# Patient Record
Sex: MALE | NOT HISPANIC OR LATINO | Employment: OTHER | URBAN - NONMETROPOLITAN AREA
[De-identification: names, ages, dates, MRNs, and addresses within clinical notes are randomized per-mention and may not be internally consistent; named-entity substitution may affect disease eponyms.]

---

## 2022-02-02 ENCOUNTER — OUTSIDE FACILITY SERVICE (OUTPATIENT)
Dept: CARDIOLOGY | Facility: CLINIC | Age: 72
End: 2022-02-02

## 2022-02-02 PROCEDURE — 93018 CV STRESS TEST I&R ONLY: CPT | Performed by: INTERNAL MEDICINE

## 2022-03-25 ENCOUNTER — OFFICE VISIT (OUTPATIENT)
Dept: CARDIOLOGY | Facility: CLINIC | Age: 72
End: 2022-03-25

## 2022-03-25 VITALS
DIASTOLIC BLOOD PRESSURE: 84 MMHG | SYSTOLIC BLOOD PRESSURE: 122 MMHG | OXYGEN SATURATION: 98 % | WEIGHT: 206 LBS | HEIGHT: 66 IN | HEART RATE: 74 BPM | BODY MASS INDEX: 33.11 KG/M2

## 2022-03-25 DIAGNOSIS — I10 ESSENTIAL HYPERTENSION: ICD-10-CM

## 2022-03-25 DIAGNOSIS — R07.9 CHEST PAIN, UNSPECIFIED TYPE: Primary | ICD-10-CM

## 2022-03-25 DIAGNOSIS — E78.5 DYSLIPIDEMIA: ICD-10-CM

## 2022-03-25 DIAGNOSIS — E66.09 CLASS 1 OBESITY DUE TO EXCESS CALORIES WITH SERIOUS COMORBIDITY AND BODY MASS INDEX (BMI) OF 33.0 TO 33.9 IN ADULT: ICD-10-CM

## 2022-03-25 DIAGNOSIS — R94.39 ABNORMAL NUCLEAR STRESS TEST: ICD-10-CM

## 2022-03-25 PROBLEM — K21.9 GERD (GASTROESOPHAGEAL REFLUX DISEASE): Status: RESOLVED | Noted: 2022-03-25 | Resolved: 2022-03-25

## 2022-03-25 PROBLEM — K21.9 GERD (GASTROESOPHAGEAL REFLUX DISEASE): Status: ACTIVE | Noted: 2022-03-25

## 2022-03-25 PROCEDURE — 93000 ELECTROCARDIOGRAM COMPLETE: CPT | Performed by: INTERNAL MEDICINE

## 2022-03-25 PROCEDURE — 99204 OFFICE O/P NEW MOD 45 MIN: CPT | Performed by: INTERNAL MEDICINE

## 2022-03-25 RX ORDER — ASPIRIN 81 MG/1
81 TABLET ORAL DAILY
Qty: 30 TABLET | Refills: 11 | Status: SHIPPED | OUTPATIENT
Start: 2022-03-25 | End: 2023-03-07

## 2022-03-25 RX ORDER — METOPROLOL SUCCINATE 25 MG/1
25 TABLET, EXTENDED RELEASE ORAL DAILY
Qty: 30 TABLET | Refills: 11 | Status: SHIPPED | OUTPATIENT
Start: 2022-03-25 | End: 2023-03-07

## 2022-03-25 RX ORDER — OLMESARTAN MEDOXOMIL 40 MG/1
40 TABLET ORAL DAILY
COMMUNITY
Start: 2022-03-04

## 2022-03-25 RX ORDER — NITROGLYCERIN 0.4 MG/1
TABLET SUBLINGUAL
Qty: 25 TABLET | Refills: 11 | Status: SHIPPED | OUTPATIENT
Start: 2022-03-25

## 2022-03-25 RX ORDER — ATORVASTATIN CALCIUM 20 MG/1
20 TABLET, FILM COATED ORAL DAILY
Qty: 30 TABLET | Refills: 11 | Status: SHIPPED | OUTPATIENT
Start: 2022-03-25 | End: 2023-03-07

## 2022-03-25 NOTE — PROGRESS NOTES
Reason for Visit: abnormal nuclear stress.    HPI:  Danyel Pedro is a 72 y.o. male is being seen for consultation today at the request of Eran Gamboa MD for evaluation of chest pain and an abnormal nuclear stress.  He reports the chest pain first started in 2019.  It has intermittently occurred since then and worsened this winter.  He particularly notices the pressure when the weather is cold.  Sometimes the pain and discomfort will radiate to his arms.  He was recently started on olmesartan for high blood pressure and the high blood pressure played a role in the decision to proceed with his recent stress test.  He is active and bikes on a regular basis.  As long as the weather is warm he doesn't seem to have any discomfort.      Previous Cardiac Testing and Procedures:  - Nuclear stress (2/2/2022) reversible septal and apical defect consistent with ischemia, EF 62%    Patient Active Problem List   Diagnosis   • Essential hypertension   • Class 1 obesity due to excess calories with serious comorbidity and body mass index (BMI) of 33.0 to 33.9 in adult   • Dyslipidemia       Social History     Tobacco Use   • Smoking status: Never Smoker   Substance Use Topics   • Alcohol use: Yes     Comment: WINE       History reviewed. No pertinent family history.    The following portions of the patient's history were reviewed and updated as appropriate: allergies, current medications, past family history, past medical history, past social history, past surgical history and problem list.      Current Outpatient Medications:   •  olmesartan (BENICAR) 40 MG tablet, Take 40 mg by mouth Daily., Disp: , Rfl:   •  aspirin (aspirin) 81 MG EC tablet, Take 1 tablet by mouth Daily., Disp: 30 tablet, Rfl: 11  •  atorvastatin (LIPITOR) 20 MG tablet, Take 1 tablet by mouth Daily., Disp: 30 tablet, Rfl: 11  •  metoprolol succinate XL (TOPROL-XL) 25 MG 24 hr tablet, Take 1 tablet by mouth Daily., Disp: 30 tablet, Rfl: 11  •  nitroglycerin  "(NITROSTAT) 0.4 MG SL tablet, 1 under the tongue as needed for angina, may repeat q5mins for up three doses, Disp: 25 tablet, Rfl: 11    Review of Systems   Constitutional: Negative for chills and fever.   Cardiovascular: Positive for chest pain. Negative for paroxysmal nocturnal dyspnea.   Respiratory: Negative for cough and shortness of breath.    Skin: Negative for rash.   Gastrointestinal: Negative for abdominal pain and heartburn.   Neurological: Negative for dizziness and numbness.       Objective   /84 (BP Location: Left arm, Patient Position: Sitting, Cuff Size: Adult)   Pulse 74   Ht 167.6 cm (66\")   Wt 93.4 kg (206 lb)   SpO2 98%   BMI 33.25 kg/m²   Constitutional:       Appearance: Well-developed. Obese.   HENT:      Head: Normocephalic and atraumatic.   Pulmonary:      Effort: Pulmonary effort is normal.      Breath sounds: Normal breath sounds.   Cardiovascular:      Normal rate. Regular rhythm.      Murmurs: There is no murmur.      No gallop. No click.   Edema:     Peripheral edema absent.   Skin:     General: Skin is warm and dry.   Neurological:      Mental Status: Alert and oriented to person, place, and time.         ECG 12 Lead    Date/Time: 3/25/2022 11:50 AM  Performed by: Sachin Fatima MD  Authorized by: Sachin Fatima MD   Previous ECG: no previous ECG available  Rhythm: sinus rhythm  Rate: normal  Conduction: right bundle branch block  Q waves: V6, V5, V4, II, III and aVF                  ICD-10-CM ICD-9-CM   1. Chest pain, unspecified type  R07.9 786.50   2. Abnormal nuclear stress test  R94.39 794.39   3. Essential hypertension  I10 401.9   4. Dyslipidemia  E78.5 272.4   5. Class 1 obesity due to excess calories with serious comorbidity and body mass index (BMI) of 33.0 to 33.9 in adult  E66.09 278.00    Z68.33 V85.33         Assessment/Plan:  1.  Chest pain: Intermittent chest pain episodes during going on for several years.  Discussed treatment options given abnormal " nuclear stress.  We will plan for trial of medical therapy initially as he does not currently take any antianginal medications.  If he continues to have chest pain symptoms then cardiac catheterization can be considered.  Start aspirin, atorvastatin, metoprolol, and sublingual nitroglycerin.    2.  Abnormal nuclear stress nuclear stress from 2/2/2022 showed reversible septal and apical defect consistent with ischemia with EF of 62%.  Started on a trial of optimal medical therapy as discussed above.    3.  Essential hypertension: Blood pressure is acceptable today at 122/84 mmHg.  Continue olmesartan.  Metoprolol started for chest pain and abnormal nuclear stress test which should also help blood pressure.    4.  Dyslipidemia: Start atorvastatin for management of coronary disease as mentioned above and obtain copy of last lipid panel from PCP.    5.  Obesity: Patient's Body mass index is 33.25 kg/m². indicating that he is obese (BMI >30). Obesity-related health conditions include the following: hypertension and coronary heart disease. Obesity is newly identified. BMI is is above average; BMI management plan is completed. We discussed portion control and increasing exercise.

## 2022-03-28 PROBLEM — E66.09 CLASS 1 OBESITY DUE TO EXCESS CALORIES WITH SERIOUS COMORBIDITY AND BODY MASS INDEX (BMI) OF 33.0 TO 33.9 IN ADULT: Status: ACTIVE | Noted: 2022-03-28

## 2022-03-28 PROBLEM — E78.5 DYSLIPIDEMIA: Status: ACTIVE | Noted: 2022-03-28

## 2022-03-28 PROBLEM — E66.811 CLASS 1 OBESITY DUE TO EXCESS CALORIES WITH SERIOUS COMORBIDITY AND BODY MASS INDEX (BMI) OF 33.0 TO 33.9 IN ADULT: Status: ACTIVE | Noted: 2022-03-28

## 2022-04-27 ENCOUNTER — OFFICE VISIT (OUTPATIENT)
Dept: CARDIOLOGY | Facility: CLINIC | Age: 72
End: 2022-04-27

## 2022-04-27 VITALS
HEART RATE: 68 BPM | WEIGHT: 204 LBS | OXYGEN SATURATION: 99 % | HEIGHT: 66 IN | DIASTOLIC BLOOD PRESSURE: 82 MMHG | BODY MASS INDEX: 32.78 KG/M2 | SYSTOLIC BLOOD PRESSURE: 136 MMHG

## 2022-04-27 DIAGNOSIS — E66.09 CLASS 1 OBESITY DUE TO EXCESS CALORIES WITH SERIOUS COMORBIDITY AND BODY MASS INDEX (BMI) OF 32.0 TO 32.9 IN ADULT: ICD-10-CM

## 2022-04-27 DIAGNOSIS — R94.39 ABNORMAL NUCLEAR STRESS TEST: ICD-10-CM

## 2022-04-27 DIAGNOSIS — E78.5 DYSLIPIDEMIA: ICD-10-CM

## 2022-04-27 DIAGNOSIS — I10 ESSENTIAL HYPERTENSION: ICD-10-CM

## 2022-04-27 DIAGNOSIS — I25.10 CORONARY ARTERY DISEASE INVOLVING NATIVE CORONARY ARTERY OF NATIVE HEART WITHOUT ANGINA PECTORIS: Primary | ICD-10-CM

## 2022-04-27 PROCEDURE — 99214 OFFICE O/P EST MOD 30 MIN: CPT | Performed by: INTERNAL MEDICINE

## 2022-10-13 ENCOUNTER — OFFICE VISIT (OUTPATIENT)
Dept: CARDIOLOGY | Facility: CLINIC | Age: 72
End: 2022-10-13

## 2022-10-13 VITALS
BODY MASS INDEX: 31.5 KG/M2 | HEIGHT: 66 IN | HEART RATE: 64 BPM | WEIGHT: 196 LBS | DIASTOLIC BLOOD PRESSURE: 84 MMHG | OXYGEN SATURATION: 98 % | SYSTOLIC BLOOD PRESSURE: 160 MMHG

## 2022-10-13 DIAGNOSIS — I10 ESSENTIAL HYPERTENSION: ICD-10-CM

## 2022-10-13 DIAGNOSIS — E78.5 DYSLIPIDEMIA: ICD-10-CM

## 2022-10-13 DIAGNOSIS — R94.39 ABNORMAL NUCLEAR STRESS TEST: ICD-10-CM

## 2022-10-13 DIAGNOSIS — E66.09 CLASS 1 OBESITY DUE TO EXCESS CALORIES WITH SERIOUS COMORBIDITY AND BODY MASS INDEX (BMI) OF 31.0 TO 31.9 IN ADULT: ICD-10-CM

## 2022-10-13 DIAGNOSIS — I25.10 CORONARY ARTERY DISEASE INVOLVING NATIVE CORONARY ARTERY OF NATIVE HEART WITHOUT ANGINA PECTORIS: Primary | ICD-10-CM

## 2022-10-13 PROCEDURE — 99214 OFFICE O/P EST MOD 30 MIN: CPT | Performed by: INTERNAL MEDICINE

## 2022-10-13 RX ORDER — TRAZODONE HYDROCHLORIDE 150 MG/1
150 TABLET ORAL
COMMUNITY
Start: 2022-09-12

## 2022-10-13 RX ORDER — AMLODIPINE BESYLATE 5 MG/1
5 TABLET ORAL DAILY
Qty: 30 TABLET | Refills: 11 | Status: SHIPPED | OUTPATIENT
Start: 2022-10-13

## 2022-10-13 NOTE — PROGRESS NOTES
Reason for Visit: cardiovascular follow up.    HPI:  Danyel Pedro is a 72 y.o. male is here today for follow-up.  He has been doing well on medical therapy and has no had any chest pain symptoms.  He notes just mild shortness of breath with activity.  His blood pressure is elevated today.  He thinks he may have had too much coffee.  He stays active and has no difficulty with physical activity.  He tries to eat healthy.  His weight is down 8 pounds compared to last visit.    Previous Cardiac Testing and Procedures:  - Nuclear stress (2/2/2022) reversible septal and apical defect consistent with ischemia, EF 62%  - Lipid panel (7/6/2022) total cholesterol 134, HDL 61, LDL 54, triglycerides 92    Patient Active Problem List   Diagnosis   • Essential hypertension   • Class 1 obesity due to excess calories with serious comorbidity and body mass index (BMI) of 31.0 to 31.9 in adult   • Dyslipidemia   • Abnormal nuclear stress test   • Coronary artery disease involving native coronary artery of native heart without angina pectoris       Social History     Tobacco Use   • Smoking status: Never   Vaping Use   • Vaping Use: Never used   Substance Use Topics   • Alcohol use: Yes     Comment: WINE   • Drug use: Defer       History reviewed. No pertinent family history.    The following portions of the patient's history were reviewed and updated as appropriate: allergies, current medications, past family history, past medical history, past social history, past surgical history and problem list.      Current Outpatient Medications:   •  aspirin (aspirin) 81 MG EC tablet, Take 1 tablet by mouth Daily., Disp: 30 tablet, Rfl: 11  •  atorvastatin (LIPITOR) 20 MG tablet, Take 1 tablet by mouth Daily., Disp: 30 tablet, Rfl: 11  •  metoprolol succinate XL (TOPROL-XL) 25 MG 24 hr tablet, Take 1 tablet by mouth Daily., Disp: 30 tablet, Rfl: 11  •  olmesartan (BENICAR) 40 MG tablet, Take 40 mg by mouth Daily., Disp: , Rfl:   •  traZODone  "(DESYREL) 150 MG tablet, Take 1 tablet by mouth every night at bedtime., Disp: , Rfl:   •  amLODIPine (NORVASC) 5 MG tablet, Take 1 tablet by mouth Daily., Disp: 30 tablet, Rfl: 11  •  nitroglycerin (NITROSTAT) 0.4 MG SL tablet, 1 under the tongue as needed for angina, may repeat q5mins for up three doses, Disp: 25 tablet, Rfl: 11    Review of Systems   Constitutional: Negative for chills and fever.   Cardiovascular: Negative for chest pain and paroxysmal nocturnal dyspnea.   Respiratory: Positive for shortness of breath. Negative for cough.    Skin: Negative for rash.   Gastrointestinal: Negative for abdominal pain and heartburn.   Neurological: Negative for dizziness and numbness.       Objective   /84 (BP Location: Left arm, Patient Position: Sitting, Cuff Size: Adult)   Pulse 64   Ht 167.6 cm (65.98\")   Wt 88.9 kg (196 lb)   SpO2 98%   BMI 31.65 kg/m²   Constitutional:       Appearance: Well-developed. Obese.   HENT:      Head: Normocephalic and atraumatic.   Pulmonary:      Effort: Pulmonary effort is normal.      Breath sounds: Normal breath sounds.   Cardiovascular:      Normal rate. Regular rhythm.      Murmurs: There is no murmur.      No gallop. No click.   Skin:     General: Skin is warm and dry.   Neurological:      Mental Status: Alert and oriented to person, place, and time.       Procedures      ICD-10-CM ICD-9-CM   1. Coronary artery disease involving native coronary artery of native heart without angina pectoris  I25.10 414.01   2. Abnormal nuclear stress test  R94.39 794.39   3. Essential hypertension  I10 401.9   4. Dyslipidemia  E78.5 272.4   5. Class 1 obesity due to excess calories with serious comorbidity and body mass index (BMI) of 31.0 to 31.9 in adult  E66.09 278.00    Z68.31 V85.31         Assessment/Plan:  1.  Coronary artery disease: Abnormal nuclear stress test showing reversible septal and apical defect on 2/22/2022.  He remains chest pain free since being on medical " therapy.  Continue aspirin, atorvastatin, metoprolol, and nitroglycerin.     2.  Abnormal nuclear stress nuclear stress:  Reversible septal and apical defect with EF of 62% from 2/22/2022.  He remains chest pain-free on medical therapy.     3.  Essential hypertension:  Blood pressure is elevated today and he notes it is also running a little high at home.  Start amlodipine.      4.  Dyslipidemia:  Lipid panel from 7/6/2022 shows good control.  Continue atorvastatin.     5.  Obesity: BMI is >= 30 and <35. (Class 1 Obesity). The following options were offered after discussion;: exercise counseling/recommendations and nutrition counseling/recommendations.

## 2023-01-19 ENCOUNTER — OFFICE VISIT (OUTPATIENT)
Dept: CARDIOLOGY | Facility: CLINIC | Age: 73
End: 2023-01-19
Payer: MEDICARE

## 2023-01-19 VITALS
SYSTOLIC BLOOD PRESSURE: 126 MMHG | HEIGHT: 66 IN | BODY MASS INDEX: 30.7 KG/M2 | OXYGEN SATURATION: 96 % | DIASTOLIC BLOOD PRESSURE: 84 MMHG | WEIGHT: 191 LBS | HEART RATE: 70 BPM

## 2023-01-19 DIAGNOSIS — I25.10 CORONARY ARTERY DISEASE INVOLVING NATIVE CORONARY ARTERY OF NATIVE HEART WITHOUT ANGINA PECTORIS: Primary | ICD-10-CM

## 2023-01-19 DIAGNOSIS — I10 ESSENTIAL HYPERTENSION: ICD-10-CM

## 2023-01-19 DIAGNOSIS — R94.39 ABNORMAL NUCLEAR STRESS TEST: ICD-10-CM

## 2023-01-19 DIAGNOSIS — E78.5 DYSLIPIDEMIA: ICD-10-CM

## 2023-01-19 PROCEDURE — 99214 OFFICE O/P EST MOD 30 MIN: CPT | Performed by: INTERNAL MEDICINE

## 2023-01-19 NOTE — PROGRESS NOTES
Reason for Visit: cardiovascular follow up.    HPI:  Danyel Pedro is a 72 y.o. male is here today for 3-month follow-up.  He got an upper respiratory infection which he attributes to spending a long time in the airport around the holidays due to flight cancellation.  He is doing well from a cardiac standpoint.  The cold weather doesn't seem to bother him as much as it use to and his breathing is better.  He denies any chest pain, palpitations, dizziness, syncope, PND, or orthopnea.  He does not exercise much this time of year.      Previous Cardiac Testing and Procedures:  - Nuclear stress (2/2/2022) reversible septal and apical defect consistent with ischemia, EF 62%    Lab data:  -Lipid panel (10/6/2022) total cholesterol 134, HDL 61, LDL 54, triglycerides 92    Patient Active Problem List   Diagnosis   • Essential hypertension   • Class 1 obesity due to excess calories with serious comorbidity and body mass index (BMI) of 30.0 to 30.9 in adult   • Dyslipidemia   • Abnormal nuclear stress test   • Coronary artery disease involving native coronary artery of native heart without angina pectoris       Social History     Tobacco Use   • Smoking status: Never   Vaping Use   • Vaping Use: Never used   Substance Use Topics   • Alcohol use: Yes     Comment: WINE   • Drug use: Defer       History reviewed. No pertinent family history.    The following portions of the patient's history were reviewed and updated as appropriate: allergies, current medications, past family history, past medical history, past social history, past surgical history and problem list.      Current Outpatient Medications:   •  amLODIPine (NORVASC) 5 MG tablet, Take 1 tablet by mouth Daily., Disp: 30 tablet, Rfl: 11  •  aspirin (aspirin) 81 MG EC tablet, Take 1 tablet by mouth Daily., Disp: 30 tablet, Rfl: 11  •  atorvastatin (LIPITOR) 20 MG tablet, Take 1 tablet by mouth Daily., Disp: 30 tablet, Rfl: 11  •  metoprolol succinate XL (TOPROL-XL) 25 MG  "24 hr tablet, Take 1 tablet by mouth Daily., Disp: 30 tablet, Rfl: 11  •  nitroglycerin (NITROSTAT) 0.4 MG SL tablet, 1 under the tongue as needed for angina, may repeat q5mins for up three doses, Disp: 25 tablet, Rfl: 11  •  olmesartan (BENICAR) 40 MG tablet, Take 40 mg by mouth Daily., Disp: , Rfl:   •  traZODone (DESYREL) 150 MG tablet, Take 1 tablet by mouth every night at bedtime., Disp: , Rfl:     Review of Systems   Constitutional: Negative for chills and fever.   Cardiovascular: Negative for chest pain and paroxysmal nocturnal dyspnea.   Respiratory: Negative for cough and shortness of breath.    Skin: Negative for rash.   Gastrointestinal: Negative for abdominal pain and heartburn.   Neurological: Negative for dizziness and numbness.       Objective   /84 (BP Location: Left arm, Patient Position: Sitting, Cuff Size: Adult)   Pulse 70   Ht 167.6 cm (65.98\")   Wt 86.6 kg (191 lb)   SpO2 96%   BMI 30.84 kg/m²   Constitutional:       Appearance: Well-developed. Obese.   HENT:      Head: Normocephalic and atraumatic.   Pulmonary:      Effort: Pulmonary effort is normal.      Breath sounds: Normal breath sounds.   Cardiovascular:      Normal rate. Regular rhythm.      Murmurs: There is no murmur.      No gallop. No click.   Skin:     General: Skin is warm and dry.   Neurological:      Mental Status: Alert and oriented to person, place, and time.       Procedures      ICD-10-CM ICD-9-CM   1. Coronary artery disease involving native coronary artery of native heart without angina pectoris  I25.10 414.01   2. Abnormal nuclear stress test  R94.39 794.39   3. Essential hypertension  I10 401.9   4. Dyslipidemia  E78.5 272.4         Assessment/Plan:  1. Coronary artery disease: Abnormal nuclear stress test showing reversible septal and apical defect on 2/22/2022.  No chest pain or other anginal symptoms.  Continue aspirin, atorvastatin, metoprolol, and nitroglycerin.     2.  Abnormal nuclear stress nuclear " stress:  Reversible septal and apical defect on 2/22/2022.   No chest pain symptoms.     3.  Essential hypertension:   Blood pressure is reasonably well controlled on metoprolol and amlodipine.     4.  Dyslipidemia: Good control on lipid panel from 10/6/2022 on atorvastatin.     5.  Obesity: BMI is >= 30 and <35. (Class 1 Obesity). The following options were offered after discussion;: exercise counseling/recommendations and nutrition counseling/recommendations.

## 2023-01-19 NOTE — LETTER
January 19, 2023     Eran Gamboa MD  [address] [place] [id number]    Patient: Danyel Pedro   YOB: 1950   Date of Visit: 1/19/2023       Dear Eran Gamboa MD    Danyel Pedro was in my office today. Below is a copy of my note.    If you have questions, please do not hesitate to call me. I look forward to following Danyel along with you.         Sincerely,        Sachin Fatima MD        CC: No Recipients      Reason for Visit: cardiovascular follow up.    HPI:  Danyel Pedro is a 72 y.o. male is here today for 3-month follow-up.  He got an upper respiratory infection which he attributes to spending a long time in the airport around the holidays due to flight cancellation.  He is doing well from a cardiac standpoint.  The cold weather doesn't seem to bother him as much as it use to and his breathing is better.  He denies any chest pain, palpitations, dizziness, syncope, PND, or orthopnea.  He does not exercise much this time of year.      Previous Cardiac Testing and Procedures:  - Nuclear stress (2/2/2022) reversible septal and apical defect consistent with ischemia, EF 62%    Lab data:  -Lipid panel (10/6/2022) total cholesterol 134, HDL 61, LDL 54, triglycerides 92    Patient Active Problem List   Diagnosis   • Essential hypertension   • Class 1 obesity due to excess calories with serious comorbidity and body mass index (BMI) of 30.0 to 30.9 in adult   • Dyslipidemia   • Abnormal nuclear stress test   • Coronary artery disease involving native coronary artery of native heart without angina pectoris       Social History     Tobacco Use   • Smoking status: Never   Vaping Use   • Vaping Use: Never used   Substance Use Topics   • Alcohol use: Yes     Comment: WINE   • Drug use: Defer       History reviewed. No pertinent family history.    The following portions of the patient's history were reviewed and updated as appropriate: allergies, current medications, past family history, past medical history,  "past social history, past surgical history and problem list.      Current Outpatient Medications:   •  amLODIPine (NORVASC) 5 MG tablet, Take 1 tablet by mouth Daily., Disp: 30 tablet, Rfl: 11  •  aspirin (aspirin) 81 MG EC tablet, Take 1 tablet by mouth Daily., Disp: 30 tablet, Rfl: 11  •  atorvastatin (LIPITOR) 20 MG tablet, Take 1 tablet by mouth Daily., Disp: 30 tablet, Rfl: 11  •  metoprolol succinate XL (TOPROL-XL) 25 MG 24 hr tablet, Take 1 tablet by mouth Daily., Disp: 30 tablet, Rfl: 11  •  nitroglycerin (NITROSTAT) 0.4 MG SL tablet, 1 under the tongue as needed for angina, may repeat q5mins for up three doses, Disp: 25 tablet, Rfl: 11  •  olmesartan (BENICAR) 40 MG tablet, Take 40 mg by mouth Daily., Disp: , Rfl:   •  traZODone (DESYREL) 150 MG tablet, Take 1 tablet by mouth every night at bedtime., Disp: , Rfl:     Review of Systems   Constitutional: Negative for chills and fever.   Cardiovascular: Negative for chest pain and paroxysmal nocturnal dyspnea.   Respiratory: Negative for cough and shortness of breath.    Skin: Negative for rash.   Gastrointestinal: Negative for abdominal pain and heartburn.   Neurological: Negative for dizziness and numbness.       Objective    /84 (BP Location: Left arm, Patient Position: Sitting, Cuff Size: Adult)   Pulse 70   Ht 167.6 cm (65.98\")   Wt 86.6 kg (191 lb)   SpO2 96%   BMI 30.84 kg/m²   Constitutional:       Appearance: Well-developed. Obese.   HENT:      Head: Normocephalic and atraumatic.   Pulmonary:      Effort: Pulmonary effort is normal.      Breath sounds: Normal breath sounds.   Cardiovascular:      Normal rate. Regular rhythm.      Murmurs: There is no murmur.      No gallop. No click.   Skin:     General: Skin is warm and dry.   Neurological:      Mental Status: Alert and oriented to person, place, and time.       Procedures      ICD-10-CM ICD-9-CM   1. Coronary artery disease involving native coronary artery of native heart without angina " pectoris  I25.10 414.01   2. Abnormal nuclear stress test  R94.39 794.39   3. Essential hypertension  I10 401.9   4. Dyslipidemia  E78.5 272.4         Assessment/Plan:  1. Coronary artery disease: Abnormal nuclear stress test showing reversible septal and apical defect on 2/22/2022.  No chest pain or other anginal symptoms.  Continue aspirin, atorvastatin, metoprolol, and nitroglycerin.     2.  Abnormal nuclear stress nuclear stress:  Reversible septal and apical defect on 2/22/2022.   No chest pain symptoms.     3.  Essential hypertension:   Blood pressure is reasonably well controlled on metoprolol and amlodipine.     4.  Dyslipidemia: Good control on lipid panel from 10/6/2022 on atorvastatin.     5.  Obesity: BMI is >= 30 and <35. (Class 1 Obesity). The following options were offered after discussion;: exercise counseling/recommendations and nutrition counseling/recommendations.

## 2023-03-07 RX ORDER — METOPROLOL SUCCINATE 25 MG/1
25 TABLET, EXTENDED RELEASE ORAL DAILY
Qty: 30 TABLET | Refills: 11 | Status: SHIPPED | OUTPATIENT
Start: 2023-03-07

## 2023-03-07 RX ORDER — ASPIRIN 81 MG/1
TABLET, COATED ORAL
Qty: 30 TABLET | Refills: 11 | Status: SHIPPED | OUTPATIENT
Start: 2023-03-07

## 2023-03-07 RX ORDER — ATORVASTATIN CALCIUM 20 MG/1
20 TABLET, FILM COATED ORAL DAILY
Qty: 30 TABLET | Refills: 11 | Status: SHIPPED | OUTPATIENT
Start: 2023-03-07

## 2023-12-11 RX ORDER — METOPROLOL SUCCINATE 25 MG/1
25 TABLET, EXTENDED RELEASE ORAL DAILY
Qty: 30 TABLET | Refills: 11 | Status: SHIPPED | OUTPATIENT
Start: 2023-12-11

## 2024-01-24 ENCOUNTER — OFFICE VISIT (OUTPATIENT)
Dept: CARDIOLOGY | Facility: CLINIC | Age: 74
End: 2024-01-24
Payer: MEDICARE

## 2024-01-24 VITALS
HEIGHT: 65 IN | DIASTOLIC BLOOD PRESSURE: 80 MMHG | HEART RATE: 74 BPM | SYSTOLIC BLOOD PRESSURE: 140 MMHG | WEIGHT: 203 LBS | OXYGEN SATURATION: 98 % | BODY MASS INDEX: 33.82 KG/M2

## 2024-01-24 DIAGNOSIS — I10 ESSENTIAL HYPERTENSION: ICD-10-CM

## 2024-01-24 DIAGNOSIS — E78.5 DYSLIPIDEMIA: ICD-10-CM

## 2024-01-24 DIAGNOSIS — I25.10 CORONARY ARTERY DISEASE INVOLVING NATIVE CORONARY ARTERY OF NATIVE HEART WITHOUT ANGINA PECTORIS: Primary | ICD-10-CM

## 2024-01-24 PROCEDURE — 3079F DIAST BP 80-89 MM HG: CPT | Performed by: INTERNAL MEDICINE

## 2024-01-24 PROCEDURE — 3077F SYST BP >= 140 MM HG: CPT | Performed by: INTERNAL MEDICINE

## 2024-01-24 PROCEDURE — 99214 OFFICE O/P EST MOD 30 MIN: CPT | Performed by: INTERNAL MEDICINE

## 2024-01-24 NOTE — LETTER
January 24, 2024     Eran Gamboa MD  [address] [place] [address]    Patient: Danyel Pedro   YOB: 1950   Date of Visit: 1/24/2024       Dear Eran Gamboa MD    Danyel Pedro was in my office today. Below is a copy of my note.    If you have questions, please do not hesitate to call me. I look forward to following Danyel along with you.         Sincerely,        Sachin Fatima MD        CC: No Recipients      Reason for Visit: cardiovascular follow up.    HPI:  Danyel Pedro is a 73 y.o. male is here today for follow-up.  Follows in cardiology clinic secondary to coronary artery disease.  He had a nuclear stress test done on 2/2/2022 showing reversible septal and apical defects concerning for ischemia.  He has been chest pain-free and therefore elected for medical therapy.     The only time he notices any symptoms is when it is cold out, then he will notice some mild tightness.  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  Blood pressure is elevated today.  He does not check his blood pressure much at home.      Previous Cardiac Testing and Procedures:  -Nuclear stress (2/2/2022) reversible septal and apical defect consistent with ischemia, EF 62%     Lab data:  -Lipid panel (10/6/2022) total cholesterol 134, HDL 61, LDL 54, triglycerides 92  -Lipid panel (1/3/2024) total cholesterol 199, HDL 73, LDL 99, triglycerides 137    Patient Active Problem List   Diagnosis   • Essential hypertension   • Class 1 obesity due to excess calories with serious comorbidity and body mass index (BMI) of 30.0 to 30.9 in adult   • Dyslipidemia   • Abnormal nuclear stress test   • Coronary artery disease involving native coronary artery of native heart without angina pectoris       Social History     Tobacco Use   • Smoking status: Never   Vaping Use   • Vaping Use: Never used   Substance Use Topics   • Alcohol use: Yes     Comment: WINE   • Drug use: Defer       History reviewed. No  "pertinent family history.    The following portions of the patient's history were reviewed and updated as appropriate: allergies, current medications, past family history, past medical history, past social history, past surgical history, and problem list.      Current Outpatient Medications:   •  amLODIPine (NORVASC) 5 MG tablet, Take 1 tablet by mouth Daily., Disp: 30 tablet, Rfl: 11  •  Aspirin Low Dose 81 MG EC tablet, TAKE 1 TABLET BY MOUTH ONCE DAILY, Disp: 30 tablet, Rfl: 11  •  atorvastatin (LIPITOR) 20 MG tablet, TAKE 1 TABLET BY MOUTH DAILY, Disp: 30 tablet, Rfl: 11  •  metoprolol succinate XL (TOPROL-XL) 25 MG 24 hr tablet, TAKE 1 TABLET BY MOUTH DAILY, Disp: 30 tablet, Rfl: 11  •  nitroglycerin (NITROSTAT) 0.4 MG SL tablet, 1 under the tongue as needed for angina, may repeat q5mins for up three doses, Disp: 25 tablet, Rfl: 11  •  olmesartan (BENICAR) 40 MG tablet, Take 1 tablet by mouth Daily., Disp: , Rfl:   •  traZODone (DESYREL) 150 MG tablet, Take 1 tablet by mouth every night at bedtime., Disp: , Rfl:     Review of Systems   Constitutional: Negative for chills and fever.   Cardiovascular:  Positive for chest pain (chest tightness during the cold). Negative for dyspnea on exertion and paroxysmal nocturnal dyspnea.   Respiratory:  Negative for cough and shortness of breath.    Skin:  Negative for rash.   Gastrointestinal:  Negative for abdominal pain and heartburn.   Neurological:  Negative for dizziness and numbness.       Objective  /80 (BP Location: Left arm, Patient Position: Sitting, Cuff Size: Adult)   Pulse 74   Ht 165.1 cm (65\")   Wt 92.1 kg (203 lb)   SpO2 98%   BMI 33.78 kg/m²   Constitutional:       Appearance: Well-developed.   HENT:      Head: Normocephalic and atraumatic.   Pulmonary:      Effort: Pulmonary effort is normal.      Breath sounds: Normal breath sounds.   Cardiovascular:      Normal rate. Regular rhythm.      Murmurs: There is no murmur.      No gallop.  No click. "   Edema:     Peripheral edema absent.   Skin:     General: Skin is warm and dry.   Neurological:      Mental Status: Alert and oriented to person, place, and time.       Procedures      ICD-10-CM ICD-9-CM   1. Coronary artery disease involving native coronary artery of native heart without angina pectoris  I25.10 414.01   2. Essential hypertension  I10 401.9   3. Dyslipidemia  E78.5 272.4         Assessment/Plan:  1.  Coronary artery disease: Abnormal nuclear stress on 2/22/2022 with reversible septal and apical defect.  Elected for medical therapy given lack of any significant symptoms.  Only mild chest tightness during cold weather but no significant angina.  Continue aspirin, atorvastatin, metoprolol, and nitroglycerin.      2.  Essential hypertension: Blood pressures is elevated today.  Encourage him to monitor at home and consider dose adjustment if necessary.  Continue amlodipine, olmesartan, and metoprolol.     3.  Dyslipidemia: Acceptable control on lipid panel from 1/3/2024.  Continue atorvastatin.

## 2024-01-24 NOTE — PROGRESS NOTES
Reason for Visit: cardiovascular follow up.    HPI:  Danyel Pedro is a 73 y.o. male is here today for follow-up.  Follows in cardiology clinic secondary to coronary artery disease.  He had a nuclear stress test done on 2/2/2022 showing reversible septal and apical defects concerning for ischemia.  He has been chest pain-free and therefore elected for medical therapy.     The only time he notices any symptoms is when it is cold out, then he will notice some mild tightness.  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  Blood pressure is elevated today.  He does not check his blood pressure much at home.      Previous Cardiac Testing and Procedures:  -Nuclear stress (2/2/2022) reversible septal and apical defect consistent with ischemia, EF 62%     Lab data:  -Lipid panel (10/6/2022) total cholesterol 134, HDL 61, LDL 54, triglycerides 92  -Lipid panel (1/3/2024) total cholesterol 199, HDL 73, LDL 99, triglycerides 137    Patient Active Problem List   Diagnosis    Essential hypertension    Class 1 obesity due to excess calories with serious comorbidity and body mass index (BMI) of 30.0 to 30.9 in adult    Dyslipidemia    Abnormal nuclear stress test    Coronary artery disease involving native coronary artery of native heart without angina pectoris       Social History     Tobacco Use    Smoking status: Never   Vaping Use    Vaping Use: Never used   Substance Use Topics    Alcohol use: Yes     Comment: WINE    Drug use: Defer       History reviewed. No pertinent family history.    The following portions of the patient's history were reviewed and updated as appropriate: allergies, current medications, past family history, past medical history, past social history, past surgical history, and problem list.      Current Outpatient Medications:     amLODIPine (NORVASC) 5 MG tablet, Take 1 tablet by mouth Daily., Disp: 30 tablet, Rfl: 11    Aspirin Low Dose 81 MG EC tablet, TAKE 1 TABLET BY  "MOUTH ONCE DAILY, Disp: 30 tablet, Rfl: 11    atorvastatin (LIPITOR) 20 MG tablet, TAKE 1 TABLET BY MOUTH DAILY, Disp: 30 tablet, Rfl: 11    metoprolol succinate XL (TOPROL-XL) 25 MG 24 hr tablet, TAKE 1 TABLET BY MOUTH DAILY, Disp: 30 tablet, Rfl: 11    nitroglycerin (NITROSTAT) 0.4 MG SL tablet, 1 under the tongue as needed for angina, may repeat q5mins for up three doses, Disp: 25 tablet, Rfl: 11    olmesartan (BENICAR) 40 MG tablet, Take 1 tablet by mouth Daily., Disp: , Rfl:     traZODone (DESYREL) 150 MG tablet, Take 1 tablet by mouth every night at bedtime., Disp: , Rfl:     Review of Systems   Constitutional: Negative for chills and fever.   Cardiovascular:  Positive for chest pain (chest tightness during the cold). Negative for dyspnea on exertion and paroxysmal nocturnal dyspnea.   Respiratory:  Negative for cough and shortness of breath.    Skin:  Negative for rash.   Gastrointestinal:  Negative for abdominal pain and heartburn.   Neurological:  Negative for dizziness and numbness.       Objective   /80 (BP Location: Left arm, Patient Position: Sitting, Cuff Size: Adult)   Pulse 74   Ht 165.1 cm (65\")   Wt 92.1 kg (203 lb)   SpO2 98%   BMI 33.78 kg/m²   Constitutional:       Appearance: Well-developed.   HENT:      Head: Normocephalic and atraumatic.   Pulmonary:      Effort: Pulmonary effort is normal.      Breath sounds: Normal breath sounds.   Cardiovascular:      Normal rate. Regular rhythm.      Murmurs: There is no murmur.      No gallop.  No click.   Edema:     Peripheral edema absent.   Skin:     General: Skin is warm and dry.   Neurological:      Mental Status: Alert and oriented to person, place, and time.       Procedures      ICD-10-CM ICD-9-CM   1. Coronary artery disease involving native coronary artery of native heart without angina pectoris  I25.10 414.01   2. Essential hypertension  I10 401.9   3. Dyslipidemia  E78.5 272.4         Assessment/Plan:  1.  Coronary artery disease: " Abnormal nuclear stress on 2/22/2022 with reversible septal and apical defect.  Elected for medical therapy given lack of any significant symptoms.  Only mild chest tightness during cold weather but no significant angina.  Continue aspirin, atorvastatin, metoprolol, and nitroglycerin.      2.  Essential hypertension: Blood pressures is elevated today.  Encourage him to monitor at home and consider dose adjustment if necessary.  Continue amlodipine, olmesartan, and metoprolol.     3.  Dyslipidemia: Acceptable control on lipid panel from 1/3/2024.  Continue atorvastatin.

## 2024-03-12 RX ORDER — AMLODIPINE BESYLATE 5 MG/1
5 TABLET ORAL DAILY
Qty: 30 TABLET | Refills: 11 | Status: SHIPPED | OUTPATIENT
Start: 2024-03-12

## 2024-03-12 RX ORDER — ATORVASTATIN CALCIUM 20 MG/1
20 TABLET, FILM COATED ORAL DAILY
Qty: 30 TABLET | Refills: 11 | Status: SHIPPED | OUTPATIENT
Start: 2024-03-12

## 2024-06-06 ENCOUNTER — OFFICE VISIT (OUTPATIENT)
Dept: CARDIOLOGY | Facility: CLINIC | Age: 74
End: 2024-06-06
Payer: MEDICARE

## 2024-06-06 VITALS
HEIGHT: 65 IN | OXYGEN SATURATION: 98 % | BODY MASS INDEX: 33.15 KG/M2 | DIASTOLIC BLOOD PRESSURE: 84 MMHG | SYSTOLIC BLOOD PRESSURE: 136 MMHG | HEART RATE: 70 BPM | WEIGHT: 199 LBS

## 2024-06-06 DIAGNOSIS — I10 ESSENTIAL HYPERTENSION: ICD-10-CM

## 2024-06-06 DIAGNOSIS — I25.118 CORONARY ARTERY DISEASE OF NATIVE ARTERY OF NATIVE HEART WITH STABLE ANGINA PECTORIS: Primary | ICD-10-CM

## 2024-06-06 DIAGNOSIS — E78.5 DYSLIPIDEMIA: ICD-10-CM

## 2024-06-06 PROCEDURE — 3075F SYST BP GE 130 - 139MM HG: CPT | Performed by: INTERNAL MEDICINE

## 2024-06-06 PROCEDURE — 3079F DIAST BP 80-89 MM HG: CPT | Performed by: INTERNAL MEDICINE

## 2024-06-06 PROCEDURE — 99214 OFFICE O/P EST MOD 30 MIN: CPT | Performed by: INTERNAL MEDICINE

## 2024-06-06 RX ORDER — AMLODIPINE BESYLATE 10 MG/1
10 TABLET ORAL DAILY
Qty: 90 TABLET | Refills: 3 | Status: SHIPPED | OUTPATIENT
Start: 2024-06-06

## 2024-06-06 NOTE — PROGRESS NOTES
Reason for Visit: cardiovascular follow up.    HPI:  Danyel Pedro is a 74 y.o. male is here today for follow-up.  He reports doing relatively well for the most part.  He has noted his blood pressure running high at home and is a little bit elevated today as well.  He has been trying to stay active and walk more whenever possible.  He Akua at first denies any chest pain but does report a little bit of tightness in his chest with activity when pressed.  He denies any palpitations, dizziness, syncope, PND, or orthopnea.      Previous Cardiac Testing and Procedures:  -Nuclear stress (2/2/2022) reversible septal and apical defect consistent with ischemia, EF 62%     Lab data:  -Lipid panel (10/6/2022) total cholesterol 134, HDL 61, LDL 54, triglycerides 92  -Lipid panel (1/3/2024) total cholesterol 199, HDL 73, LDL 99, triglycerides 137    Patient Active Problem List   Diagnosis    Essential hypertension    Class 1 obesity due to excess calories with serious comorbidity and body mass index (BMI) of 30.0 to 30.9 in adult    Dyslipidemia    Abnormal nuclear stress test    Coronary artery disease of native artery of native heart with stable angina pectoris       Social History     Tobacco Use    Smoking status: Never   Vaping Use    Vaping status: Never Used   Substance Use Topics    Alcohol use: Yes     Comment: WINE    Drug use: Defer       History reviewed. No pertinent family history.    The following portions of the patient's history were reviewed and updated as appropriate: allergies, current medications, past family history, past medical history, past social history, past surgical history, and problem list.      Current Outpatient Medications:     amLODIPine (NORVASC) 10 MG tablet, Take 1 tablet by mouth Daily., Disp: 90 tablet, Rfl: 3    Aspirin Low Dose 81 MG EC tablet, TAKE 1 TABLET BY MOUTH ONCE DAILY, Disp: 30 tablet, Rfl: 11    atorvastatin (LIPITOR) 20 MG tablet, Take 1 tablet by mouth Daily., Disp: 30 tablet,  "Rfl: 11    metoprolol succinate XL (TOPROL-XL) 25 MG 24 hr tablet, TAKE 1 TABLET BY MOUTH DAILY, Disp: 30 tablet, Rfl: 11    olmesartan (BENICAR) 40 MG tablet, Take 1 tablet by mouth Daily., Disp: , Rfl:     traZODone (DESYREL) 150 MG tablet, Take 1 tablet by mouth every night at bedtime., Disp: , Rfl:     nitroglycerin (NITROSTAT) 0.4 MG SL tablet, 1 under the tongue as needed for angina, may repeat q5mins for up three doses (Patient not taking: Reported on 6/6/2024), Disp: 25 tablet, Rfl: 11    Review of Systems   Constitutional: Negative for chills and fever.   Cardiovascular:  Positive for chest pain (chest tightness). Negative for paroxysmal nocturnal dyspnea.   Respiratory:  Negative for cough and shortness of breath.    Skin:  Negative for rash.   Gastrointestinal:  Negative for abdominal pain and heartburn.   Neurological:  Negative for dizziness and numbness.       Objective   /84 (BP Location: Left arm, Patient Position: Sitting, Cuff Size: Adult)   Pulse 70   Ht 165.1 cm (65\")   Wt 90.3 kg (199 lb)   SpO2 98%   BMI 33.12 kg/m²   Constitutional:       Appearance: Well-developed.   HENT:      Head: Normocephalic and atraumatic.   Pulmonary:      Effort: Pulmonary effort is normal.      Breath sounds: Normal breath sounds.   Cardiovascular:      Normal rate. Regular rhythm.      Murmurs: There is no murmur.   Edema:     Peripheral edema absent.   Skin:     General: Skin is warm and dry.   Neurological:      Mental Status: Alert and oriented to person, place, and time.       Procedures      ICD-10-CM ICD-9-CM   1. Coronary artery disease of native artery of native heart with stable angina pectoris  I25.118 414.01     413.9   2. Essential hypertension  I10 401.9   3. Dyslipidemia  E78.5 272.4         Assessment/Plan:  1.  Coronary artery disease: Abnormal nuclear stress on 2/22/2022 with reversible septal and apical defect.  He denies any actual chest pain but does report some mild chest tightness " with activity.  We again discussed cardiac catheterization and he prefers to hold off for now but will consider this in the future.  Continue aspirin, atorvastatin, metoprolol, and nitroglycerin.      2.  Essential hypertension: Blood pressure remains elevated today.  Titrate up amlodipine to 10 mg.  Continue olmesartan and metoprolol.      3.  Dyslipidemia: Lipid panel on 1/3/2024 showed reasonably good control on atorvastatin.

## 2024-06-06 NOTE — LETTER
June 6, 2024     Eran Gamboa MD  [address] [facility] [address]    Patient: Danyel Pedro   YOB: 1950   Date of Visit: 6/6/2024       Dear Eran Gamboa MD    aDnyel Pedro was in my office today. Below is a copy of my note.    If you have questions, please do not hesitate to call me. I look forward to following Danyel along with you.         Sincerely,        Sachin Fatima MD        CC: No Recipients      Reason for Visit: cardiovascular follow up.    HPI:  Danyel Pedro is a 74 y.o. male is here today for follow-up.  He reports doing relatively well for the most part.  He has noted his blood pressure running high at home and is a little bit elevated today as well.  He has been trying to stay active and walk more whenever possible.  He Ak[place] at first denies any chest pain but does report a little bit of tightness in his chest with activity when pressed.  He denies any palpitations, dizziness, syncope, PND, or orthopnea.      Previous Cardiac Testing and Procedures:  -Nuclear stress (2/2/2022) reversible septal and apical defect consistent with ischemia, EF 62%     Lab data:  -Lipid panel (10/6/2022) total cholesterol 134, HDL 61, LDL 54, triglycerides 92  -Lipid panel (1/3/2024) total cholesterol 199, HDL 73, LDL 99, triglycerides 137    Patient Active Problem List   Diagnosis   • Essential hypertension   • Class 1 obesity due to excess calories with serious comorbidity and body mass index (BMI) of 30.0 to 30.9 in adult   • Dyslipidemia   • Abnormal nuclear stress test   • Coronary artery disease of native artery of native heart with stable angina pectoris       Social History     Tobacco Use   • Smoking status: Never   Vaping Use   • Vaping status: Never Used   Substance Use Topics   • Alcohol use: Yes     Comment: WINE   • Drug use: Defer       History reviewed. No pertinent family history.    The following portions of the patient's history were reviewed and updated as appropriate: allergies, current  "medications, past family history, past medical history, past social history, past surgical history, and problem list.      Current Outpatient Medications:   •  amLODIPine (NORVASC) 10 MG tablet, Take 1 tablet by mouth Daily., Disp: 90 tablet, Rfl: 3  •  Aspirin Low Dose 81 MG EC tablet, TAKE 1 TABLET BY MOUTH ONCE DAILY, Disp: 30 tablet, Rfl: 11  •  atorvastatin (LIPITOR) 20 MG tablet, Take 1 tablet by mouth Daily., Disp: 30 tablet, Rfl: 11  •  metoprolol succinate XL (TOPROL-XL) 25 MG 24 hr tablet, TAKE 1 TABLET BY MOUTH DAILY, Disp: 30 tablet, Rfl: 11  •  olmesartan (BENICAR) 40 MG tablet, Take 1 tablet by mouth Daily., Disp: , Rfl:   •  traZODone (DESYREL) 150 MG tablet, Take 1 tablet by mouth every night at bedtime., Disp: , Rfl:   •  nitroglycerin (NITROSTAT) 0.4 MG SL tablet, 1 under the tongue as needed for angina, may repeat q5mins for up three doses (Patient not taking: Reported on 6/6/2024), Disp: 25 tablet, Rfl: 11    Review of Systems   Constitutional: Negative for chills and fever.   Cardiovascular:  Positive for chest pain (chest tightness). Negative for paroxysmal nocturnal dyspnea.   Respiratory:  Negative for cough and shortness of breath.    Skin:  Negative for rash.   Gastrointestinal:  Negative for abdominal pain and heartburn.   Neurological:  Negative for dizziness and numbness.       Objective  /84 (BP Location: Left arm, Patient Position: Sitting, Cuff Size: Adult)   Pulse 70   Ht 165.1 cm (65\")   Wt 90.3 kg (199 lb)   SpO2 98%   BMI 33.12 kg/m²   Constitutional:       Appearance: Well-developed.   HENT:      Head: Normocephalic and atraumatic.   Pulmonary:      Effort: Pulmonary effort is normal.      Breath sounds: Normal breath sounds.   Cardiovascular:      Normal rate. Regular rhythm.      Murmurs: There is no murmur.   Edema:     Peripheral edema absent.   Skin:     General: Skin is warm and dry.   Neurological:      Mental Status: Alert and oriented to person, place, and " time.       Procedures      ICD-10-CM ICD-9-CM   1. Coronary artery disease of native artery of native heart with stable angina pectoris  I25.118 414.01     413.9   2. Essential hypertension  I10 401.9   3. Dyslipidemia  E78.5 272.4         Assessment/Plan:  1.  Coronary artery disease: Abnormal nuclear stress on 2/22/2022 with reversible septal and apical defect.  He denies any actual chest pain but does report some mild chest tightness with activity.  We again discussed cardiac catheterization and he prefers to hold off for now but will consider this in the future.  Continue aspirin, atorvastatin, metoprolol, and nitroglycerin.      2.  Essential hypertension: Blood pressure remains elevated today.  Titrate up amlodipine to 10 mg.  Continue olmesartan and metoprolol.      3.  Dyslipidemia: Lipid panel on 1/3/2024 showed reasonably good control on atorvastatin.

## 2024-10-02 NOTE — PROGRESS NOTES
Reason for Visit: cardiovascular follow up.    HPI:  Danyel Pedro is a 74 y.o. male is here today for follow-up.  At last visit in June he reported mild chest tightness.  Cardiac catheterization was discussed and he elected to hold off.      Today he denies any chest pain or dyspnea on exertion.  He has not had to take any nitroglycerin.  Blood pressure has been well controlled recently.  He started walking more whenever possible.  He does not feel like he eats much, but has not been successful at losing weight.      Previous Cardiac Testing and Procedures:  -Nuclear stress (2/2/2022) reversible septal and apical defect consistent with ischemia, EF 62%     Lab data:  -Lipid panel (10/6/2022) total cholesterol 134, HDL 61, LDL 54, triglycerides 92  -Lipid panel (1/3/2024) total cholesterol 199, HDL 73, LDL 99, triglycerides 137    Patient Active Problem List   Diagnosis    Essential hypertension    Class 1 obesity due to excess calories with serious comorbidity and body mass index (BMI) of 30.0 to 30.9 in adult    Dyslipidemia    Abnormal nuclear stress test    Coronary artery disease involving native coronary artery of native heart without angina pectoris       Social History     Tobacco Use    Smoking status: Never   Vaping Use    Vaping status: Never Used   Substance Use Topics    Alcohol use: Yes     Comment: WINE    Drug use: Defer       History reviewed. No pertinent family history.    The following portions of the patient's history were reviewed and updated as appropriate: allergies, current medications, past family history, past medical history, past social history, past surgical history, and problem list.      Current Outpatient Medications:     amLODIPine (NORVASC) 10 MG tablet, Take 1 tablet by mouth Daily., Disp: 90 tablet, Rfl: 3    Aspirin Low Dose 81 MG EC tablet, TAKE 1 TABLET BY MOUTH ONCE DAILY, Disp: 30 tablet, Rfl: 11    atorvastatin (LIPITOR) 20 MG tablet, Take 1 tablet by mouth Daily., Disp: 30  "tablet, Rfl: 11    metoprolol succinate XL (TOPROL-XL) 25 MG 24 hr tablet, TAKE 1 TABLET BY MOUTH DAILY, Disp: 30 tablet, Rfl: 11    nitroglycerin (NITROSTAT) 0.4 MG SL tablet, 1 under the tongue as needed for angina, may repeat q5mins for up three doses, Disp: 25 tablet, Rfl: 11    olmesartan (BENICAR) 40 MG tablet, Take 1 tablet by mouth Daily., Disp: , Rfl:     traZODone (DESYREL) 150 MG tablet, Take 1 tablet by mouth every night at bedtime., Disp: , Rfl:     Review of Systems   Constitutional: Negative for chills and fever.   Cardiovascular:  Negative for chest pain, dyspnea on exertion, palpitations, paroxysmal nocturnal dyspnea and syncope.   Respiratory:  Negative for cough and shortness of breath.    Skin:  Negative for rash.   Gastrointestinal:  Negative for abdominal pain and heartburn.   Neurological:  Negative for dizziness and numbness.       Objective   /84 (BP Location: Left arm, Patient Position: Sitting, Cuff Size: Adult)   Pulse 75   Ht 165.1 cm (65\")   Wt 91.6 kg (202 lb)   SpO2 98%   BMI 33.61 kg/m²   Constitutional:       Appearance: Well-developed. Obese.   HENT:      Head: Normocephalic and atraumatic.   Pulmonary:      Effort: Pulmonary effort is normal.      Breath sounds: Normal breath sounds.   Cardiovascular:      Normal rate. Regular rhythm.      Murmurs: There is a grade 1/6 holosystolic murmur.   Edema:     Peripheral edema absent.   Skin:     General: Skin is warm and dry.   Neurological:      Mental Status: Alert and oriented to person, place, and time.       Procedures      ICD-10-CM ICD-9-CM   1. Coronary artery disease involving native coronary artery of native heart without angina pectoris  I25.10 414.01   2. Essential hypertension  I10 401.9   3. Dyslipidemia  E78.5 272.4         Assessment/Plan:  1.  Coronary artery disease: Abnormal nuclear stress on 2/22/2022 with reversible septal and apical defect.  No current chest pain symptoms.  He elects to continue medical " therapy.  Continue aspirin, atorvastatin, metoprolol, and nitroglycerin.      2.  Essential hypertension: Improved control today.  Continue olmesartan, amlodipine, and metoprolol.      3.  Dyslipidemia: Good control on atorvastatin based on lipid panel on 1/3/2024.

## 2024-10-03 ENCOUNTER — OFFICE VISIT (OUTPATIENT)
Dept: CARDIOLOGY | Facility: CLINIC | Age: 74
End: 2024-10-03
Payer: MEDICARE

## 2024-10-03 VITALS
DIASTOLIC BLOOD PRESSURE: 84 MMHG | SYSTOLIC BLOOD PRESSURE: 130 MMHG | BODY MASS INDEX: 33.66 KG/M2 | OXYGEN SATURATION: 98 % | WEIGHT: 202 LBS | HEIGHT: 65 IN | HEART RATE: 75 BPM

## 2024-10-03 DIAGNOSIS — I10 ESSENTIAL HYPERTENSION: ICD-10-CM

## 2024-10-03 DIAGNOSIS — E78.5 DYSLIPIDEMIA: ICD-10-CM

## 2024-10-03 DIAGNOSIS — I25.10 CORONARY ARTERY DISEASE INVOLVING NATIVE CORONARY ARTERY OF NATIVE HEART WITHOUT ANGINA PECTORIS: Primary | ICD-10-CM

## 2024-10-03 PROBLEM — I25.118 CORONARY ARTERY DISEASE OF NATIVE ARTERY OF NATIVE HEART WITH STABLE ANGINA PECTORIS: Status: RESOLVED | Noted: 2022-04-27 | Resolved: 2024-10-03

## 2024-10-03 PROCEDURE — 3079F DIAST BP 80-89 MM HG: CPT | Performed by: INTERNAL MEDICINE

## 2024-10-03 PROCEDURE — 3075F SYST BP GE 130 - 139MM HG: CPT | Performed by: INTERNAL MEDICINE

## 2024-10-03 PROCEDURE — 99214 OFFICE O/P EST MOD 30 MIN: CPT | Performed by: INTERNAL MEDICINE

## 2024-10-03 PROCEDURE — 1159F MED LIST DOCD IN RCRD: CPT | Performed by: INTERNAL MEDICINE

## 2024-10-03 PROCEDURE — 1160F RVW MEDS BY RX/DR IN RCRD: CPT | Performed by: INTERNAL MEDICINE

## 2024-10-03 NOTE — LETTER
October 3, 2024     Eran Gamboa MD  [address] [place] [id number]    Patient: Danyel Pedro   YOB: 1950   Date of Visit: 10/3/2024       Dear Eran Gamboa MD    Danyel Pedro was in my office today. Below is a copy of my note.    If you have questions, please do not hesitate to call me. I look forward to following Danyel along with you.         Sincerely,        Sachin Fatima MD        CC: No Recipients      Reason for Visit: cardiovascular follow up.    HPI:  Danyel Pedro is a 74 y.o. male is here today for follow-up.  At last visit in June he reported mild chest tightness.  Cardiac catheterization was discussed and he elected to hold off.      Today he denies any chest pain or dyspnea on exertion.  He has not had to take any nitroglycerin.  Blood pressure has been well controlled recently.  He started walking more whenever possible.  He does not feel like he eats much, but has not been successful at losing weight.      Previous Cardiac Testing and Procedures:  -Nuclear stress (2/2/2022) reversible septal and apical defect consistent with ischemia, EF 62%     Lab data:  -Lipid panel (10/6/2022) total cholesterol 134, HDL 61, LDL 54, triglycerides 92  -Lipid panel (1/3/2024) total cholesterol 199, HDL 73, LDL 99, triglycerides 137    Patient Active Problem List   Diagnosis   • Essential hypertension   • Class 1 obesity due to excess calories with serious comorbidity and body mass index (BMI) of 30.0 to 30.9 in adult   • Dyslipidemia   • Abnormal nuclear stress test   • Coronary artery disease involving native coronary artery of native heart without angina pectoris       Social History     Tobacco Use   • Smoking status: Never   Vaping Use   • Vaping status: Never Used   Substance Use Topics   • Alcohol use: Yes     Comment: WINE   • Drug use: Defer       History reviewed. No pertinent family history.    The following portions of the patient's history were reviewed and updated as appropriate:  "allergies, current medications, past family history, past medical history, past social history, past surgical history, and problem list.      Current Outpatient Medications:   •  amLODIPine (NORVASC) 10 MG tablet, Take 1 tablet by mouth Daily., Disp: 90 tablet, Rfl: 3  •  Aspirin Low Dose 81 MG EC tablet, TAKE 1 TABLET BY MOUTH ONCE DAILY, Disp: 30 tablet, Rfl: 11  •  atorvastatin (LIPITOR) 20 MG tablet, Take 1 tablet by mouth Daily., Disp: 30 tablet, Rfl: 11  •  metoprolol succinate XL (TOPROL-XL) 25 MG 24 hr tablet, TAKE 1 TABLET BY MOUTH DAILY, Disp: 30 tablet, Rfl: 11  •  nitroglycerin (NITROSTAT) 0.4 MG SL tablet, 1 under the tongue as needed for angina, may repeat q5mins for up three doses, Disp: 25 tablet, Rfl: 11  •  olmesartan (BENICAR) 40 MG tablet, Take 1 tablet by mouth Daily., Disp: , Rfl:   •  traZODone (DESYREL) 150 MG tablet, Take 1 tablet by mouth every night at bedtime., Disp: , Rfl:     Review of Systems   Constitutional: Negative for chills and fever.   Cardiovascular:  Negative for chest pain, dyspnea on exertion, palpitations, paroxysmal nocturnal dyspnea and syncope.   Respiratory:  Negative for cough and shortness of breath.    Skin:  Negative for rash.   Gastrointestinal:  Negative for abdominal pain and heartburn.   Neurological:  Negative for dizziness and numbness.       Objective  /84 (BP Location: Left arm, Patient Position: Sitting, Cuff Size: Adult)   Pulse 75   Ht 165.1 cm (65\")   Wt 91.6 kg (202 lb)   SpO2 98%   BMI 33.61 kg/m²   Constitutional:       Appearance: Well-developed. Obese.   HENT:      Head: Normocephalic and atraumatic.   Pulmonary:      Effort: Pulmonary effort is normal.      Breath sounds: Normal breath sounds.   Cardiovascular:      Normal rate. Regular rhythm.      Murmurs: There is a grade 1/6 holosystolic murmur.   Edema:     Peripheral edema absent.   Skin:     General: Skin is warm and dry.   Neurological:      Mental Status: Alert and oriented to " person, place, and time.       Procedures      ICD-10-CM ICD-9-CM   1. Coronary artery disease involving native coronary artery of native heart without angina pectoris  I25.10 414.01   2. Essential hypertension  I10 401.9   3. Dyslipidemia  E78.5 272.4         Assessment/Plan:  1.  Coronary artery disease: Abnormal nuclear stress on 2/22/2022 with reversible septal and apical defect.  No current chest pain symptoms.  He elects to continue medical therapy.  Continue aspirin, atorvastatin, metoprolol, and nitroglycerin.      2.  Essential hypertension: Improved control today.  Continue olmesartan, amlodipine, and metoprolol.      3.  Dyslipidemia: Good control on atorvastatin based on lipid panel on 1/3/2024.

## 2024-12-04 RX ORDER — METOPROLOL SUCCINATE 25 MG/1
25 TABLET, EXTENDED RELEASE ORAL DAILY
Qty: 30 TABLET | Refills: 11 | Status: SHIPPED | OUTPATIENT
Start: 2024-12-04

## 2025-03-03 RX ORDER — ATORVASTATIN CALCIUM 20 MG/1
20 TABLET, FILM COATED ORAL DAILY
Qty: 30 TABLET | Refills: 11 | Status: SHIPPED | OUTPATIENT
Start: 2025-03-03

## 2025-05-01 ENCOUNTER — OFFICE VISIT (OUTPATIENT)
Dept: CARDIOLOGY | Facility: CLINIC | Age: 75
End: 2025-05-01
Payer: MEDICARE

## 2025-05-01 VITALS
HEIGHT: 65 IN | BODY MASS INDEX: 35.49 KG/M2 | HEART RATE: 69 BPM | SYSTOLIC BLOOD PRESSURE: 136 MMHG | DIASTOLIC BLOOD PRESSURE: 86 MMHG | WEIGHT: 213 LBS | OXYGEN SATURATION: 97 %

## 2025-05-01 DIAGNOSIS — I25.10 CORONARY ARTERY DISEASE INVOLVING NATIVE CORONARY ARTERY OF NATIVE HEART WITHOUT ANGINA PECTORIS: Primary | ICD-10-CM

## 2025-05-01 DIAGNOSIS — R94.39 ABNORMAL NUCLEAR STRESS TEST: ICD-10-CM

## 2025-05-01 DIAGNOSIS — E66.01 CLASS 2 SEVERE OBESITY DUE TO EXCESS CALORIES WITH SERIOUS COMORBIDITY AND BODY MASS INDEX (BMI) OF 35.0 TO 35.9 IN ADULT: ICD-10-CM

## 2025-05-01 DIAGNOSIS — E66.812 CLASS 2 SEVERE OBESITY DUE TO EXCESS CALORIES WITH SERIOUS COMORBIDITY AND BODY MASS INDEX (BMI) OF 35.0 TO 35.9 IN ADULT: ICD-10-CM

## 2025-05-01 DIAGNOSIS — E78.5 DYSLIPIDEMIA: ICD-10-CM

## 2025-05-01 NOTE — PROGRESS NOTES
Reason for Visit: cardiovascular follow up of coronary artery disease and an abnormal nuclear stress.    HPI:  Danyel Pedro is a 75 y.o. male is here today for follow-up.  Nuclear stress on 2/2/2022 showed reversible septal and apical ischemia.  He has remained asymptomatic and has elected for continued medical therapy.  He is feeling well today and denies any chest pain.  He likes the warmer weather better as the cold air seems to bother him.  He denies any palpitations, dizziness, syncope, PND, or orthopnea.  He has been busy lately and has not been able to exercise much.  He has gained about 11 lbs since last visit due to not enough exercise.  Blood pressure is better controlled at home than it is today.  He tries to eat healthy and get lots of fruits and vegetables.      Previous Cardiac Testing and Procedures:  -Nuclear stress (2/2/2022) reversible septal and apical defect consistent with ischemia, EF 62%     Lab data:  -Lipid panel (10/6/2022) total cholesterol 134, HDL 61, LDL 54, triglycerides 92  -Lipid panel (1/3/2024) total cholesterol 199, HDL 73, LDL 99, triglycerides 137    Patient Active Problem List   Diagnosis    Essential hypertension    Class 2 severe obesity due to excess calories with serious comorbidity and body mass index (BMI) of 35.0 to 35.9 in adult    Dyslipidemia    Abnormal nuclear stress test    Coronary artery disease involving native coronary artery of native heart without angina pectoris       Social History     Tobacco Use    Smoking status: Never   Vaping Use    Vaping status: Never Used   Substance Use Topics    Alcohol use: Yes     Comment: WINE    Drug use: Defer       History reviewed. No pertinent family history.    The following portions of the patient's history were reviewed and updated as appropriate: allergies, current medications, past family history, past medical history, past social history, past surgical history, and problem list.      Current Outpatient Medications:  "    amLODIPine (NORVASC) 10 MG tablet, Take 1 tablet by mouth Daily., Disp: 90 tablet, Rfl: 3    Aspirin Low Dose 81 MG EC tablet, TAKE 1 TABLET BY MOUTH ONCE DAILY, Disp: 30 tablet, Rfl: 11    atorvastatin (LIPITOR) 20 MG tablet, TAKE 1 TABLET BY MOUTH DAILY, Disp: 30 tablet, Rfl: 11    metoprolol succinate XL (TOPROL-XL) 25 MG 24 hr tablet, TAKE 1 TABLET BY MOUTH DAILY, Disp: 30 tablet, Rfl: 11    nitroglycerin (NITROSTAT) 0.4 MG SL tablet, 1 under the tongue as needed for angina, may repeat q5mins for up three doses, Disp: 25 tablet, Rfl: 11    olmesartan (BENICAR) 40 MG tablet, Take 1 tablet by mouth Daily., Disp: , Rfl:     traZODone (DESYREL) 150 MG tablet, Take 1 tablet by mouth every night at bedtime., Disp: , Rfl:     Review of Systems   Constitutional: Negative for chills and fever.   Cardiovascular:  Negative for chest pain and paroxysmal nocturnal dyspnea.   Respiratory:  Negative for cough and shortness of breath.    Skin:  Negative for rash.   Gastrointestinal:  Negative for abdominal pain and heartburn.   Neurological:  Negative for dizziness and numbness.       Objective   /86 (BP Location: Left arm, Patient Position: Sitting, Cuff Size: Adult)   Pulse 69   Ht 165.1 cm (65\")   Wt 96.6 kg (213 lb)   SpO2 97%   BMI 35.45 kg/m²   Constitutional:       Appearance: Well-developed. Obese.   HENT:      Head: Normocephalic and atraumatic.   Pulmonary:      Effort: Pulmonary effort is normal.      Breath sounds: Normal breath sounds.   Cardiovascular:      Normal rate. Regular rhythm.   Edema:     Peripheral edema absent.   Skin:     General: Skin is warm and dry.   Neurological:      Mental Status: Alert and oriented to person, place, and time.       Procedures      ICD-10-CM ICD-9-CM   1. Coronary artery disease involving native coronary artery of native heart without angina pectoris  I25.10 414.01   2. Abnormal nuclear stress test  R94.39 794.39   3. Dyslipidemia  E78.5 272.4   4. Class 2 severe " obesity due to excess calories with serious comorbidity and body mass index (BMI) of 35.0 to 35.9 in adult  E66.812 278.01    E66.01 V85.35    Z68.35          Assessment/Plan:  1.  Coronary artery disease: Abnormal nuclear stress on 2/22/2022 with reversible septal and apical defect.  He remains asymptomatic.  Continue medical therapy with aspirin, atorvastatin, metoprolol, and nitroglycerin.      2.  Essential hypertension: Blood pressure is borderline today, but typically controlled at home.  Continue olmesartan, amlodipine, and metoprolol.      3.  Dyslipidemia: Continue atorvastatin and obtain copy of last lipid panel from his PCP.     4.  Obesity: Body mass index is 35.45 kg/m².  Weight is increasing recently.   on lifestyle modification.

## 2025-05-20 RX ORDER — ATORVASTATIN CALCIUM 40 MG/1
40 TABLET, FILM COATED ORAL DAILY
Qty: 30 TABLET | Refills: 11 | Status: SHIPPED | OUTPATIENT
Start: 2025-05-20

## 2025-06-06 RX ORDER — AMLODIPINE BESYLATE 10 MG/1
10 TABLET ORAL DAILY
Qty: 90 TABLET | Refills: 3 | Status: SHIPPED | OUTPATIENT
Start: 2025-06-06